# Patient Record
Sex: MALE | HISPANIC OR LATINO | URBAN - METROPOLITAN AREA
[De-identification: names, ages, dates, MRNs, and addresses within clinical notes are randomized per-mention and may not be internally consistent; named-entity substitution may affect disease eponyms.]

---

## 2020-07-28 ENCOUNTER — EMERGENCY (EMERGENCY)
Facility: HOSPITAL | Age: 50
LOS: 0 days | Discharge: HOME | End: 2020-07-28
Payer: MEDICAID

## 2020-07-28 VITALS
TEMPERATURE: 97 F | SYSTOLIC BLOOD PRESSURE: 113 MMHG | OXYGEN SATURATION: 98 % | RESPIRATION RATE: 18 BRPM | HEART RATE: 80 BPM | DIASTOLIC BLOOD PRESSURE: 64 MMHG

## 2020-07-28 DIAGNOSIS — Z20.828 CONTACT WITH AND (SUSPECTED) EXPOSURE TO OTHER VIRAL COMMUNICABLE DISEASES: ICD-10-CM

## 2020-07-28 PROCEDURE — 99283 EMERGENCY DEPT VISIT LOW MDM: CPT

## 2020-07-28 PROCEDURE — U0003: CPT

## 2020-07-28 NOTE — ED STATDOCS - NSFOLLOWUPINSTRUCTIONS_ED_ALL_ED_FT
Pt here for COVID-19 testing. Pt non toxic. Pt was swabbed for COVID-19 in their car in drive [place] area. [facility] will call pt with results. return precautions given. Home self-quarantine recommended. Pt agrees with plan of  care.

## 2020-07-28 NOTE — ED ADULT TRIAGE NOTE - CHIEF COMPLAINT QUOTE
pt presents to ED for covid testing pt with no symptoms pt provides verbal consent to receive results via text

## 2020-07-28 NOTE — ED STATDOCS - PATIENT PORTAL LINK FT
You can access the FollowMyHealth Patient Portal offered by [facility] by registering at the following website: [email or web address]. By joining [facility]’s FollowMyHealth portal, you will also be able to view your health information using other applications (apps) compatible with our system.

## 2020-07-28 NOTE — ED ADULT NURSE NOTE - OBJECTIVE STATEMENT
DISCHARGE INSTRUCTIONS REVIEWED WITH PATIENT VERBALLY, PT VERBALIZED UNDERSTANDING OF DISCHARGE INSTRUCTIONS. PAPER COPY OF DISCHARGE INSTRUCTIONS GIVEN TO PATIENT WITH SELF QUARANTINE AND COVID 19 INFORMATION.pt provides verbal consent to receive results via text or email

## 2020-07-29 LAB — SARS-COV-2 RNA SPEC QL NAA+PROBE: SIGNIFICANT CHANGE UP

## 2022-12-09 ENCOUNTER — OFFICE (OUTPATIENT)
Dept: URBAN - METROPOLITAN AREA CLINIC 12 | Facility: CLINIC | Age: 52
Setting detail: OPHTHALMOLOGY
End: 2022-12-09
Payer: COMMERCIAL

## 2022-12-09 DIAGNOSIS — H01.004: ICD-10-CM

## 2022-12-09 DIAGNOSIS — H43.393: ICD-10-CM

## 2022-12-09 DIAGNOSIS — H01.001: ICD-10-CM

## 2022-12-09 PROBLEM — H52.7 REFRACTIVE ERROR: Status: ACTIVE | Noted: 2022-12-09

## 2022-12-09 PROBLEM — H11.153 PINGUECULA; BOTH EYES: Status: ACTIVE | Noted: 2022-12-09

## 2022-12-09 PROCEDURE — 92004 COMPRE OPH EXAM NEW PT 1/>: CPT | Performed by: OPHTHALMOLOGY

## 2022-12-09 ASSESSMENT — TONOMETRY
OS_IOP_MMHG: 17
OD_IOP_MMHG: 16

## 2022-12-09 ASSESSMENT — REFRACTION_MANIFEST
OD_CYLINDER: -0.75
OD_SPHERE: +0.25
OD_VA1: 20/20
OS_VA1: 20/20
OD_ADD: +2.00
OS_CYLINDER: -0.50
OS_ADD: +2.00
OD_AXIS: 070
OS_SPHERE: +0.25
OS_AXIS: 94

## 2022-12-09 ASSESSMENT — AXIALLENGTH_DERIVED
OS_AL: 23.8294
OD_AL: 23.5489
OS_AL: 23.7305
OD_AL: 23.6463

## 2022-12-09 ASSESSMENT — KERATOMETRY
OD_K1POWER_DIOPTERS: 43.50
OD_K2POWER_DIOPTERS: 44.00
OS_K1POWER_DIOPTERS: 43.00
OD_AXISANGLE_DEGREES: 136
OS_AXISANGLE_DEGREES: 053
OS_K2POWER_DIOPTERS: 43.25
METHOD_AUTO_MANUAL: AUTO

## 2022-12-09 ASSESSMENT — REFRACTION_CURRENTRX
OD_OVR_VA: 20/
OS_SPHERE: PLANO
OS_OVR_VA: 20/
OD_SPHERE: -0.25
OS_AXIS: 095
OD_CYLINDER: -0.25
OS_CYLINDER: -0.50
OS_ADD: +1.50
OD_ADD: +1.50
OS_VPRISM_DIRECTION: PROGS
OD_VPRISM_DIRECTION: PROGS
OD_AXIS: 071

## 2022-12-09 ASSESSMENT — REFRACTION_AUTOREFRACTION
OS_SPHERE: +0.25
OS_CYLINDER: -1.00
OD_CYLINDER: -1.25
OS_AXIS: 101
OD_SPHERE: +0.25
OD_AXIS: 070

## 2022-12-09 ASSESSMENT — SPHEQUIV_DERIVED
OS_SPHEQUIV: -0.25
OD_SPHEQUIV: -0.375
OD_SPHEQUIV: -0.125
OS_SPHEQUIV: 0

## 2022-12-09 ASSESSMENT — VISUAL ACUITY
OS_BCVA: 20/30-2
OD_BCVA: 20/40-1

## 2022-12-09 ASSESSMENT — LID EXAM ASSESSMENTS
OS_BLEPHARITIS: T
OD_BLEPHARITIS: T

## 2022-12-09 ASSESSMENT — CONFRONTATIONAL VISUAL FIELD TEST (CVF)
OS_FINDINGS: FULL
OD_FINDINGS: FULL

## 2024-06-25 ENCOUNTER — OFFICE (OUTPATIENT)
Dept: URBAN - METROPOLITAN AREA CLINIC 12 | Facility: CLINIC | Age: 54
Setting detail: OPHTHALMOLOGY
End: 2024-06-25
Payer: COMMERCIAL

## 2024-06-25 DIAGNOSIS — H11.153: ICD-10-CM

## 2024-06-25 DIAGNOSIS — H01.004: ICD-10-CM

## 2024-06-25 DIAGNOSIS — D31.30: ICD-10-CM

## 2024-06-25 DIAGNOSIS — H40.013: ICD-10-CM

## 2024-06-25 DIAGNOSIS — H43.393: ICD-10-CM

## 2024-06-25 DIAGNOSIS — H01.001: ICD-10-CM

## 2024-06-25 PROBLEM — E11.9 DIABETES TYPE 2 NO RETINOPATHY; 
BOTH EYES: Status: ACTIVE | Noted: 2024-06-25

## 2024-06-25 PROCEDURE — 92250 FUNDUS PHOTOGRAPHY W/I&R: CPT | Performed by: OPHTHALMOLOGY

## 2024-06-25 PROCEDURE — 92014 COMPRE OPH EXAM EST PT 1/>: CPT | Performed by: OPHTHALMOLOGY

## 2024-06-25 ASSESSMENT — LID EXAM ASSESSMENTS
OD_BLEPHARITIS: T
OS_BLEPHARITIS: T

## 2024-06-25 ASSESSMENT — CONFRONTATIONAL VISUAL FIELD TEST (CVF)
OS_FINDINGS: FULL
OD_FINDINGS: FULL

## 2024-10-17 ENCOUNTER — OFFICE (OUTPATIENT)
Dept: URBAN - METROPOLITAN AREA CLINIC 12 | Facility: CLINIC | Age: 54
Setting detail: OPHTHALMOLOGY
End: 2024-10-17
Payer: COMMERCIAL

## 2024-10-17 DIAGNOSIS — E11.9: ICD-10-CM

## 2024-10-17 DIAGNOSIS — D31.30: ICD-10-CM

## 2024-10-17 DIAGNOSIS — H11.153: ICD-10-CM

## 2024-10-17 DIAGNOSIS — H40.013: ICD-10-CM

## 2024-10-17 DIAGNOSIS — H43.393: ICD-10-CM

## 2024-10-17 PROCEDURE — 76514 ECHO EXAM OF EYE THICKNESS: CPT | Performed by: OPHTHALMOLOGY

## 2024-10-17 PROCEDURE — 92083 EXTENDED VISUAL FIELD XM: CPT | Performed by: OPHTHALMOLOGY

## 2024-10-17 PROCEDURE — 92133 CPTRZD OPH DX IMG PST SGM ON: CPT | Performed by: OPHTHALMOLOGY

## 2024-10-17 PROCEDURE — 92014 COMPRE OPH EXAM EST PT 1/>: CPT | Performed by: OPHTHALMOLOGY

## 2024-10-17 ASSESSMENT — REFRACTION_MANIFEST
OD_AXIS: 066
OD_VA1: 20/20
OD_VA1: 20/20
OS_AXIS: 095
OS_VA1: 20/20
OD_CYLINDER: -0.75
OS_ADD: +2.25
OS_CYLINDER: -0.75
OD_ADD: +2.25
OD_SPHERE: +0.25
OS_ADD: +2.00
OD_AXIS: 070
OS_VA1: 20/20
OS_AXIS: 94
OD_SPHERE: +0.25
OD_CYLINDER: -0.75
OD_ADD: +2.00
OS_SPHERE: +0.25
OS_SPHERE: +0.25
OS_CYLINDER: -0.50

## 2024-10-17 ASSESSMENT — REFRACTION_AUTOREFRACTION
OD_SPHERE: +0.75
OD_AXIS: 078
OS_SPHERE: +0.50
OS_CYLINDER: -1.00
OD_CYLINDER: -1.25
OS_AXIS: 107

## 2024-10-17 ASSESSMENT — VISUAL ACUITY
OS_BCVA: 20/30-2
OD_BCVA: 20/25-

## 2024-10-17 ASSESSMENT — KERATOMETRY
METHOD_AUTO_MANUAL: AUTO
OD_AXISANGLE_DEGREES: 146
OS_AXISANGLE_DEGREES: 032
OD_K1POWER_DIOPTERS: 43.25
OS_K2POWER_DIOPTERS: 43.50
OS_K1POWER_DIOPTERS: 43.00
OD_K2POWER_DIOPTERS: 44.00

## 2024-10-17 ASSESSMENT — SUPERFICIAL PUNCTATE KERATITIS (SPK)
OS_SPK: 1+
OD_SPK: 1+

## 2024-10-17 ASSESSMENT — CONFRONTATIONAL VISUAL FIELD TEST (CVF)
OD_FINDINGS: FULL
OS_FINDINGS: FULL

## 2024-10-17 ASSESSMENT — REFRACTION_CURRENTRX
OS_ADD: +1.50
OD_AXIS: 071
OD_SPHERE: -0.25
OS_VPRISM_DIRECTION: PROGS
OS_OVR_VA: 20/
OS_AXIS: 095
OD_VPRISM_DIRECTION: PROGS
OD_ADD: +1.50
OD_CYLINDER: -0.25
OS_CYLINDER: -0.50
OD_OVR_VA: 20/
OS_SPHERE: PLANO

## 2024-10-17 ASSESSMENT — PACHYMETRY
OD_CT_UM: 596
OS_CT_UM: 595
OS_CT_CORRECTION: -4
OD_CT_CORRECTION: -4

## 2024-10-17 ASSESSMENT — LID EXAM ASSESSMENTS
OS_BLEPHARITIS: T
OD_BLEPHARITIS: T

## 2024-10-17 ASSESSMENT — LID POSITION - PTOSIS
OS_PTOSIS: LUL 1+ 2+
OD_PTOSIS: RUL 1+ 2+

## 2024-10-17 ASSESSMENT — TONOMETRY
OS_IOP_MMHG: 16
OD_IOP_MMHG: 14

## 2024-10-30 ENCOUNTER — RX ONLY (RX ONLY)
Age: 54
End: 2024-10-30

## 2024-10-30 ENCOUNTER — OFFICE (OUTPATIENT)
Dept: URBAN - METROPOLITAN AREA CLINIC 88 | Facility: CLINIC | Age: 54
Setting detail: OPHTHALMOLOGY
End: 2024-10-30
Payer: COMMERCIAL

## 2024-10-30 DIAGNOSIS — D31.30: ICD-10-CM

## 2024-10-30 PROBLEM — H04.123 DES- DRY EYES; BOTH EYES: Status: ACTIVE | Noted: 2024-10-17

## 2024-10-30 PROBLEM — H02.403 PTOSIS OF EYELID, UNSPECIFIED; BOTH EYES: Status: ACTIVE | Noted: 2024-10-17

## 2024-10-30 PROCEDURE — 92012 INTRM OPH EXAM EST PATIENT: CPT | Performed by: OPHTHALMOLOGY

## 2024-10-30 PROCEDURE — 92134 CPTRZ OPH DX IMG PST SGM RTA: CPT | Performed by: OPHTHALMOLOGY

## 2024-10-30 ASSESSMENT — KERATOMETRY
OS_K2POWER_DIOPTERS: 43.50
OD_K2POWER_DIOPTERS: 44.00
OS_K1POWER_DIOPTERS: 43.00
OD_K1POWER_DIOPTERS: 43.25
OS_AXISANGLE_DEGREES: 032
OD_AXISANGLE_DEGREES: 146
METHOD_AUTO_MANUAL: AUTO

## 2024-10-30 ASSESSMENT — TONOMETRY
OS_IOP_MMHG: 15
OD_IOP_MMHG: 12

## 2024-10-30 ASSESSMENT — CONFRONTATIONAL VISUAL FIELD TEST (CVF)
OD_FINDINGS: FULL
OS_FINDINGS: FULL

## 2024-10-30 ASSESSMENT — LID EXAM ASSESSMENTS
OS_BLEPHARITIS: T
OD_BLEPHARITIS: T

## 2024-10-30 ASSESSMENT — PACHYMETRY
OD_CT_UM: 596
OD_CT_CORRECTION: -4
OS_CT_CORRECTION: -4
OS_CT_UM: 595

## 2024-10-30 ASSESSMENT — REFRACTION_AUTOREFRACTION
OD_CYLINDER: -1.25
OS_AXIS: 107
OS_SPHERE: +0.50
OD_AXIS: 078
OS_CYLINDER: -1.00
OD_SPHERE: +0.75

## 2024-10-30 ASSESSMENT — LID POSITION - PTOSIS
OS_PTOSIS: LUL 1+ 2+
OD_PTOSIS: RUL 1+ 2+

## 2024-10-30 ASSESSMENT — VISUAL ACUITY
OD_BCVA: 20/25
OS_BCVA: 20/30-1

## 2024-10-30 ASSESSMENT — SUPERFICIAL PUNCTATE KERATITIS (SPK)
OS_SPK: 1+
OD_SPK: 1+

## 2025-06-17 ENCOUNTER — OFFICE (OUTPATIENT)
Dept: URBAN - METROPOLITAN AREA CLINIC 88 | Facility: CLINIC | Age: 55
Setting detail: OPHTHALMOLOGY
End: 2025-06-17
Payer: COMMERCIAL

## 2025-06-17 DIAGNOSIS — E11.9: ICD-10-CM

## 2025-06-17 DIAGNOSIS — D31.30: ICD-10-CM

## 2025-06-17 PROCEDURE — 92014 COMPRE OPH EXAM EST PT 1/>: CPT | Performed by: OPHTHALMOLOGY

## 2025-06-17 PROCEDURE — 92250 FUNDUS PHOTOGRAPHY W/I&R: CPT | Performed by: OPHTHALMOLOGY

## 2025-06-17 ASSESSMENT — SUPERFICIAL PUNCTATE KERATITIS (SPK)
OS_SPK: 1+
OD_SPK: 1+

## 2025-06-17 ASSESSMENT — REFRACTION_AUTOREFRACTION
OS_AXIS: 107
OD_AXIS: 078
OS_CYLINDER: -1.00
OD_CYLINDER: -1.25
OD_SPHERE: +0.75
OS_SPHERE: +0.50

## 2025-06-17 ASSESSMENT — KERATOMETRY
OS_AXISANGLE_DEGREES: 032
OD_K1POWER_DIOPTERS: 43.25
METHOD_AUTO_MANUAL: AUTO
OS_K2POWER_DIOPTERS: 43.50
OD_AXISANGLE_DEGREES: 146
OS_K1POWER_DIOPTERS: 43.00
OD_K2POWER_DIOPTERS: 44.00

## 2025-06-17 ASSESSMENT — TONOMETRY
OD_IOP_MMHG: 17
OS_IOP_MMHG: 15

## 2025-06-17 ASSESSMENT — PACHYMETRY
OS_CT_CORRECTION: -4
OD_CT_UM: 596
OD_CT_CORRECTION: -4
OS_CT_UM: 595

## 2025-06-17 ASSESSMENT — VISUAL ACUITY
OS_BCVA: 20/30-1
OD_BCVA: 20/25

## 2025-06-17 ASSESSMENT — CONFRONTATIONAL VISUAL FIELD TEST (CVF)
OS_FINDINGS: FULL
OD_FINDINGS: FULL

## 2025-06-17 ASSESSMENT — LID EXAM ASSESSMENTS
OS_BLEPHARITIS: T
OD_BLEPHARITIS: T

## 2025-06-17 ASSESSMENT — LID POSITION - PTOSIS
OD_PTOSIS: RUL 1+ 2+
OS_PTOSIS: LUL 1+ 2+

## 2025-06-25 ENCOUNTER — OFFICE (OUTPATIENT)
Dept: URBAN - METROPOLITAN AREA CLINIC 12 | Facility: CLINIC | Age: 55
Setting detail: OPHTHALMOLOGY
End: 2025-06-25
Payer: COMMERCIAL

## 2025-06-25 DIAGNOSIS — E11.9: ICD-10-CM

## 2025-06-25 DIAGNOSIS — D31.30: ICD-10-CM

## 2025-06-25 DIAGNOSIS — H40.013: ICD-10-CM

## 2025-06-25 DIAGNOSIS — H04.123: ICD-10-CM

## 2025-06-25 PROCEDURE — 92083 EXTENDED VISUAL FIELD XM: CPT | Performed by: OPHTHALMOLOGY

## 2025-06-25 PROCEDURE — 92014 COMPRE OPH EXAM EST PT 1/>: CPT | Performed by: OPHTHALMOLOGY

## 2025-06-25 PROCEDURE — 92133 CPTRZD OPH DX IMG PST SGM ON: CPT | Performed by: OPHTHALMOLOGY

## 2025-06-25 ASSESSMENT — LID POSITION - PTOSIS
OS_PTOSIS: LUL 1+ 2+
OD_PTOSIS: RUL 1+ 2+

## 2025-06-25 ASSESSMENT — KERATOMETRY
OS_AXISANGLE_DEGREES: 064
OD_K2POWER_DIOPTERS: 44.00
OD_AXISANGLE_DEGREES: 132
METHOD_AUTO_MANUAL: AUTO
OS_K1POWER_DIOPTERS: 43.00
OS_K2POWER_DIOPTERS: 43.50
OD_K1POWER_DIOPTERS: 43.50

## 2025-06-25 ASSESSMENT — TONOMETRY
OD_IOP_MMHG: 12
OS_IOP_MMHG: 11

## 2025-06-25 ASSESSMENT — REFRACTION_MANIFEST
OS_CYLINDER: -0.50
OS_VA1: 20/20-1
OD_VA1: 20/20-2
OD_CYLINDER: -0.50
OD_AXIS: 080
OS_AXIS: 100
OD_SPHERE: +2.50
OS_SPHERE: +2.50

## 2025-06-25 ASSESSMENT — LID EXAM ASSESSMENTS
OS_BLEPHARITIS: T
OD_BLEPHARITIS: T

## 2025-06-25 ASSESSMENT — SUPERFICIAL PUNCTATE KERATITIS (SPK)
OS_SPK: 1+
OD_SPK: 1+

## 2025-06-25 ASSESSMENT — PACHYMETRY
OD_CT_CORRECTION: -4
OD_CT_UM: 596
OS_CT_CORRECTION: -4
OS_CT_UM: 595

## 2025-06-25 ASSESSMENT — REFRACTION_AUTOREFRACTION
OD_CYLINDER: -0.75
OS_CYLINDER: -0.75
OD_SPHERE: +0.50
OD_AXIS: 073
OS_SPHERE: +0.25
OS_AXIS: 103

## 2025-06-25 ASSESSMENT — VISUAL ACUITY
OD_BCVA: 20/40+2
OS_BCVA: 20/30-2

## 2025-06-25 ASSESSMENT — CONFRONTATIONAL VISUAL FIELD TEST (CVF)
OD_FINDINGS: FULL
OS_FINDINGS: FULL